# Patient Record
Sex: MALE | Race: WHITE | URBAN - METROPOLITAN AREA
[De-identification: names, ages, dates, MRNs, and addresses within clinical notes are randomized per-mention and may not be internally consistent; named-entity substitution may affect disease eponyms.]

---

## 2018-03-20 ENCOUNTER — EMERGENCY (EMERGENCY)
Facility: HOSPITAL | Age: 58
LOS: 1 days | Discharge: HOME | End: 2018-03-20
Admitting: EMERGENCY MEDICINE
Payer: MEDICAID

## 2018-03-20 VITALS
HEART RATE: 86 BPM | OXYGEN SATURATION: 98 % | SYSTOLIC BLOOD PRESSURE: 134 MMHG | DIASTOLIC BLOOD PRESSURE: 86 MMHG | TEMPERATURE: 97 F | RESPIRATION RATE: 18 BRPM

## 2018-03-20 DIAGNOSIS — F10.120 ALCOHOL ABUSE WITH INTOXICATION, UNCOMPLICATED: ICD-10-CM

## 2018-03-20 DIAGNOSIS — R41.82 ALTERED MENTAL STATUS, UNSPECIFIED: ICD-10-CM

## 2018-03-20 PROCEDURE — 99284 EMERGENCY DEPT VISIT MOD MDM: CPT | Mod: 25

## 2018-03-20 NOTE — ED PROVIDER NOTE - OBJECTIVE STATEMENT
57 year old male with h/o alcohol abuse presents with AMS status   atient BIBA from [facility] for alcohol intoxication; admits to drinking 57 year old male with h/o alcohol abuse presents with AMS status, brought in by EMS from [facility]. Admits to heavy ETOH use today, no other complaints.  Placed in stretcher and quickly falls asleep.  Unable to cooperate with remainder of history.

## 2018-03-20 NOTE — ED PROVIDER NOTE - PROGRESS NOTE DETAILS
patient sitting in chair, with steady gait. playing on ipad The patient is now awake and alert, clinically sober.  Able to walk a straight line.  Repeat exam and neuro/cranial nerve exams normal.  No evidence of head/neck trauma.  Patient denies any pain or other complaints.  Denies cp/sob/ha/abd pain.  Abd soft, lungs clear, heart exam normal.  Joel po challenge.  Patient says only used alcohol no other substances.  Denies any assault.  Feels much better and pt feels safe for discharge.  No evidence of intoxication at this time or alcohol withdrawal.  No other complaints on discharge. patient refusing to leave. police called to escort patient out

## 2018-03-20 NOTE — ED PROVIDER NOTE - NS ED ROS FT
· CONSTITUTIONAL: no fever and no chills.  · CARDIOVASCULAR: normal rate and rhythm, no chest pain and no edema.  · RESPIRATORY: no chest pain, no cough, and no shortness of breath.  · GASTROINTESTINAL: no abdominal pain, no bloating, no constipation, no diarrhea, no nausea and no vomiting.  · MUSCULOSKELETAL: no back pain, no musculoskeletal pain, no neck pain, and no weakness.  · SKIN: no abrasions, no jaundice, no lesions, no pruritis, and no rashes.  · NEURO: no loss of consciousness, no gait abnormality, no headache, no sensory deficits, and no weakness.  · PSYCHIATRIC: no known mental health issues.

## 2018-03-20 NOTE — ED PROVIDER NOTE - MEDICAL DECISION MAKING DETAILS
The patient is now awake and alert, clinically sober.  Able to walk a straight line.  Repeat exam and neuro/cranial nerve exams normal.  No evidence of head/neck trauma.  Patient denies any pain or other complaints.  Denies cp/sob/ha/abd pain.  Abd soft, lungs clear, heart exam normal.  Joel po challenge.  Patient says only used alcohol no other substances.  Denies any assault.  Feels much better and pt feels safe for discharge.  No evidence of intoxication at this time or alcohol withdrawal.  No other complaints on discharge.

## 2019-06-06 ENCOUNTER — HOSPITAL ENCOUNTER (INPATIENT)
Dept: HOSPITAL 74 - YASAS | Age: 59
LOS: 4 days | Discharge: HOME | End: 2019-06-10
Attending: SURGERY | Admitting: SURGERY
Payer: COMMERCIAL

## 2019-06-06 VITALS — BODY MASS INDEX: 20.9 KG/M2

## 2019-06-06 DIAGNOSIS — F32.9: ICD-10-CM

## 2019-06-06 DIAGNOSIS — F41.9: ICD-10-CM

## 2019-06-06 DIAGNOSIS — F12.20: ICD-10-CM

## 2019-06-06 DIAGNOSIS — B18.2: ICD-10-CM

## 2019-06-06 DIAGNOSIS — D70.9: ICD-10-CM

## 2019-06-06 DIAGNOSIS — Z86.69: ICD-10-CM

## 2019-06-06 DIAGNOSIS — F10.230: Primary | ICD-10-CM

## 2019-06-06 DIAGNOSIS — Z94.5: ICD-10-CM

## 2019-06-06 DIAGNOSIS — F17.210: ICD-10-CM

## 2019-06-06 LAB
ALBUMIN SERPL-MCNC: 3.8 G/DL (ref 3.4–5)
ALP SERPL-CCNC: 51 U/L (ref 45–117)
ALT SERPL-CCNC: 81 U/L (ref 13–61)
ANION GAP SERPL CALC-SCNC: 6 MMOL/L (ref 8–16)
AST SERPL-CCNC: 79 U/L (ref 15–37)
BILIRUB SERPL-MCNC: 0.8 MG/DL (ref 0.2–1)
BUN SERPL-MCNC: 7 MG/DL (ref 7–18)
CALCIUM SERPL-MCNC: 9.1 MG/DL (ref 8.5–10.1)
CHLORIDE SERPL-SCNC: 102 MMOL/L (ref 98–107)
CO2 SERPL-SCNC: 30 MMOL/L (ref 21–32)
CREAT SERPL-MCNC: 0.7 MG/DL (ref 0.55–1.3)
DEPRECATED RDW RBC AUTO: 12.8 % (ref 11.9–15.9)
GLUCOSE SERPL-MCNC: 95 MG/DL (ref 74–106)
HCT VFR BLD CALC: 41.8 % (ref 35.4–49)
HGB BLD-MCNC: 14.4 GM/DL (ref 11.7–16.9)
MCH RBC QN AUTO: 35.1 PG (ref 25.7–33.7)
MCHC RBC AUTO-ENTMCNC: 34.6 G/DL (ref 32–35.9)
MCV RBC: 101.6 FL (ref 80–96)
PLATELET # BLD AUTO: 100 K/MM3 (ref 134–434)
PMV BLD: 9.6 FL (ref 7.5–11.1)
POTASSIUM SERPLBLD-SCNC: 5 MMOL/L (ref 3.5–5.1)
PROT SERPL-MCNC: 7.5 G/DL (ref 6.4–8.2)
RBC # BLD AUTO: 4.11 M/MM3 (ref 4–5.6)
SODIUM SERPL-SCNC: 138 MMOL/L (ref 136–145)
WBC # BLD AUTO: 2.7 K/MM3 (ref 4–10)

## 2019-06-06 PROCEDURE — HZ2ZZZZ DETOXIFICATION SERVICES FOR SUBSTANCE ABUSE TREATMENT: ICD-10-PCS | Performed by: SURGERY

## 2019-06-06 RX ADMIN — Medication SCH MG: at 23:00

## 2019-06-06 RX ADMIN — HYDROCORTISONE SCH: 1 CREAM TOPICAL at 18:31

## 2019-06-06 RX ADMIN — HYDROCORTISONE SCH APPLIC: 1 CREAM TOPICAL at 23:01

## 2019-06-06 RX ADMIN — BACLOFEN PRN MG: 10 TABLET ORAL at 23:00

## 2019-06-06 NOTE — EKG
Test Reason : 

Blood Pressure : ***/*** mmHG

Vent. Rate : 043 BPM     Atrial Rate : 043 BPM

   P-R Int : 142 ms          QRS Dur : 092 ms

    QT Int : 474 ms       P-R-T Axes : 031 026 050 degrees

   QTc Int : 400 ms

 

MARKED SINUS BRADYCARDIA

ABNORMAL ECG

NO PREVIOUS ECGS AVAILABLE

Confirmed by QUENTIN RYAN MD ([id number]) on 6/6/2019 3:40:05 PM

 

Referred By:             Confirmed By:QUENTIN RYAN MD
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
## 2019-06-07 RX ADMIN — Medication PRN MG: at 22:07

## 2019-06-07 RX ADMIN — BACLOFEN PRN MG: 10 TABLET ORAL at 22:07

## 2019-06-07 RX ADMIN — HYDROCORTISONE SCH: 1 CREAM TOPICAL at 22:08

## 2019-06-07 RX ADMIN — Medication SCH MG: at 22:07

## 2019-06-07 RX ADMIN — HYDROCORTISONE SCH: 1 CREAM TOPICAL at 17:23

## 2019-06-07 RX ADMIN — Medication SCH TAB: at 10:02

## 2019-06-07 RX ADMIN — HYDROCORTISONE SCH: 1 CREAM TOPICAL at 14:08

## 2019-06-07 RX ADMIN — HYDROCORTISONE SCH: 1 CREAM TOPICAL at 10:03

## 2019-06-07 NOTE — PN
S CIWA





- CIWA Score


Nausea/Vomitin


Muscle Tremors: 2


Anxiety: 3


Agitation: 2


Paroxysmal Sweats: 1-Minimal Palms Moist


Orientation: 0-Oriented


Tacttile Disturbances: 1-Very Mild Itch/Numbness


Auditory Disturbances: 1-Very Mild


Visual Disturbances: 0-None


Headache: 2-Mild


CIWA-Ar Total Score: 14





BHS Progress Note (SOAP)


Subjective: 





alert,irritable,anxious,interrupted sleep,tremor


Objective: 





19 11:59


 Vital Signs











Temperature  97.0 F L  19 09:41


 


Pulse Rate  41 L  19 09:41


 


Respiratory Rate  17   19 09:41


 


Blood Pressure  115/71   19 09:41


 


O2 Sat by Pulse Oximetry (%)      








 Laboratory Last Values











WBC  2.7 K/mm3 (4.0-10.0)  L  19  13:40    


 


RBC  4.11 M/mm3 (4.00-5.60)   19  13:40    


 


Hgb  14.4 GM/dL (11.7-16.9)   19  13:40    


 


Hct  41.8 % (35.4-49)   19  13:40    


 


MCV  101.6 fl (80-96)  H  19  13:40    


 


MCH  35.1 pg (25.7-33.7)  H  19  13:40    


 


MCHC  34.6 g/dl (32.0-35.9)   19  13:40    


 


RDW  12.8 % (11.9-15.9)   19  13:40    


 


Plt Count  100 K/MM3 (134-434)  L  19  13:40    


 


MPV  9.6 fl (7.5-11.1)   19  13:40    


 


Platelet Comment  No clumping noted   19  13:40    


 


Sodium  138 mmol/L (136-145)   19  13:40    


 


Potassium  5.0 mmol/L (3.5-5.1)   19  13:40    


 


Chloride  102 mmol/L ()   19  13:40    


 


Carbon Dioxide  30 mmol/L (21-32)   19  13:40    


 


Anion Gap  6 MMOL/L (8-16)  L  19  13:40    


 


BUN  7 mg/dL (7-18)   19  13:40    


 


Creatinine  0.7 mg/dL (0.55-1.3)   19  13:40    


 


Est GFR (CKD-EPI)AfAm  121.41   19  13:40    


 


Est GFR (CKD-EPI)NonAf  104.76   19  13:40    


 


Random Glucose  95 mg/dL ()   19  13:40    


 


Calcium  9.1 mg/dL (8.5-10.1)   19  13:40    


 


Total Bilirubin  0.8 mg/dL (0.2-1)   19  13:40    


 


AST  79 U/L (15-37)  H  19  13:40    


 


ALT  81 U/L (13-61)  H  19  13:40    


 


Alkaline Phosphatase  51 U/L ()   19  13:40    


 


Total Protein  7.5 g/dl (6.4-8.2)   19  13:40    


 


Albumin  3.8 g/dl (3.4-5.0)   19  13:40    


 


RPR Titer  Nonreactive  (NONREACTIVE)   19  13:40    











Assessment: 





19 12:00


withdrawal symptom


Plan: 





continue detox,wbc is 2.7,ast 79,alt81

## 2019-06-07 NOTE — CONSULT
[facility] Psychiatric Consult





- Data


Date of interview: 06/07/19


Admission source: [facility]


Identifying data: First admission to [facility] for this 60 y/o  male 

self-referred (on the advice of his  from [facility]) for 

detoxification (alcohol, canabis). Examined at [address] [place] [address]. Patient is single, a 

father of two, domiciled, unemployed and supported on odd jobs. Mr Beck is 

a  [employer]  (three years of service).


Substance Abuse History: Confirmed by the patient in this interview. Details in 

current BHS report : Smoking history: Current every day smoker.  Have you 

smoked in the past 12 months: Yes.  Aproximately how many cigarettes per day: 

6.  Hx Chewing Tobacco Use: No.  Initiated information on smoking cessation: 

Yes.  'Breaking Loose' booklet given: 06/06/19.  - Substance & Tx. History.  Hx 

Alcohol Use: Yes.  Hx Substance Use: No.  Substance Use Type: Alcohol.  Hx 

Substance Use Treatment: Yes (last detox few months ago at [facility]).  - Substances 

abused.  ** Alcohol.  Substance route: Oral.  Frequency: Daily.  Amount used: 

VODKA- 1L BEERS- 10X 16OZ CANS.  Age of first use: 11.  Date of last use: 06/05/ 19


Medical History: Remarkable for hepatitis C and a recent history of withdrawal-

related seizures.


Psychiatric History: Patient endorses a history of one psychiatric 

hospitalization, in 2018, at the [facility] in the [place]. 

Diagnosed with MDD and PTSD. Mr Beck indicates that he has been prescribed 

psychotropic medications. No recall of names. " I never took them. I don't like 

taking medications of any kind. I threw them away. I don't remember their names 

". No show at the [facility] OPD clinic for 3-4 months (self-report). Patient 

denies history of suicide attempts.


Physical/Sexual Abuse/Trauma History: Patient denies.


Additional Comment: Urine drug screen results: THC-Marijuana. Noted.





Mental Status Exam





- Mental Status Exam


Alert and Oriented to: Time, Place, Person


Cognitive Function: Good


Patient Appearance: Well Groomed


Mood: Withdrawn, Hopeful


Affect: Appropriate, Normal Range


Patient Behavior: Fatigued, Appropriate (friendly), Cooperative


Speech Pattern: Clear, Appropriate


Voice Loudness: Normal


Thought Process: Intact, Goal Oriented


Thought Disorder: Not Present


Hallucinations: Denies


Suicidal Ideation: Denies


Homicidal Ideation: Denies


Insight/Judgement: Fair


Sleep: Well


Appetite: Good


Muscle strength/Tone: Normal


Gait/Station: Normal





Psychiatric Findings





- Problem List (Axis 1, 2,3)


(1) Alcohol dependence with uncomplicated withdrawal


Status: Acute   





(2) Cannabis dependence


Status: Chronic   





(3) Nicotine dependence


Status: Acute   


Qualifiers: 


   Nicotine product type: cigarettes   Substance use status: in withdrawal   

Qualified Code(s): F17.213 - Nicotine dependence, cigarettes, with withdrawal   





(4) History of depression


Status: Chronic   





- Initial Treatment Plan


Initial Treatment Plan: Psychoeducation. Sleep hygiene. Relapse prevention (MAT

) : discussed in this session. Detoxification. Observation.

## 2019-06-08 RX ADMIN — HYDROCORTISONE SCH APPLIC: 1 CREAM TOPICAL at 17:20

## 2019-06-08 RX ADMIN — MAGNESIUM HYDROXIDE PRN ML: 400 SUSPENSION ORAL at 14:30

## 2019-06-08 RX ADMIN — Medication SCH TAB: at 10:21

## 2019-06-08 RX ADMIN — HYDROCORTISONE SCH: 1 CREAM TOPICAL at 14:18

## 2019-06-08 RX ADMIN — HYDROCORTISONE SCH: 1 CREAM TOPICAL at 10:23

## 2019-06-08 RX ADMIN — HYDROCORTISONE SCH: 1 CREAM TOPICAL at 22:24

## 2019-06-08 RX ADMIN — Medication SCH MG: at 22:23

## 2019-06-08 NOTE — PN
[facility] CIWA





- CIWA Score


Nausea/Vomitin-No Nausea/No Vomiting


Muscle Tremors: 2


Anxiety: 2


Agitation: 2


Paroxysmal Sweats: 3


Orientation: 0-Oriented


Tacttile Disturbances: 0-None


Auditory Disturbances: 0-None


Visual Disturbances: 0-None


Headache: 2-Mild


CIWA-Ar Total Score: 11





BHS Progress Note (SOAP)


Subjective: 





c/o sweats, anxiety, headache, and interrupted sleep.


Objective: 





19 11:41


 Vital Signs











  19





  06:13 09:36


 


Temperature 97.2 F L 96.3 F L


 


Pulse Rate 47 L 55 L


 


Respiratory 18 18





Rate  


 


Blood Pressure 120/69 121/74








 Laboratory Last Values











WBC  2.7 K/mm3 (4.0-10.0)  L  19  13:40    


 


RBC  4.11 M/mm3 (4.00-5.60)   19  13:40    


 


Hgb  14.4 GM/dL (11.7-16.9)   19  13:40    


 


Hct  41.8 % (35.4-49)   19  13:40    


 


MCV  101.6 fl (80-96)  H  19  13:40    


 


MCH  35.1 pg (25.7-33.7)  H  19  13:40    


 


MCHC  34.6 g/dl (32.0-35.9)   19  13:40    


 


RDW  12.8 % (11.9-15.9)   19  13:40    


 


Plt Count  100 K/MM3 (134-434)  L  19  13:40    


 


MPV  9.6 fl (7.5-11.1)   19  13:40    


 


Platelet Comment  No clumping noted   19  13:40    


 


Sodium  138 mmol/L (136-145)   19  13:40    


 


Potassium  5.0 mmol/L (3.5-5.1)   19  13:40    


 


Chloride  102 mmol/L ()   19  13:40    


 


Carbon Dioxide  30 mmol/L (21-32)   19  13:40    


 


Anion Gap  6 MMOL/L (8-16)  L  19  13:40    


 


BUN  7 mg/dL (7-18)   19  13:40    


 


Creatinine  0.7 mg/dL (0.55-1.3)   19  13:40    


 


Est GFR (CKD-EPI)AfAm  121.41   19  13:40    


 


Est GFR (CKD-EPI)NonAf  104.76   19  13:40    


 


Random Glucose  95 mg/dL ()   19  13:40    


 


Calcium  9.1 mg/dL (8.5-10.1)   19  13:40    


 


Total Bilirubin  0.8 mg/dL (0.2-1)   19  13:40    


 


AST  79 U/L (15-37)  H  19  13:40    


 


ALT  81 U/L (13-61)  H  19  13:40    


 


Alkaline Phosphatase  51 U/L ()   19  13:40    


 


Total Protein  7.5 g/dl (6.4-8.2)   19  13:40    


 


Albumin  3.8 g/dl (3.4-5.0)   19  13:40    


 


RPR Titer  Nonreactive  (NONREACTIVE)   19  13:40    








Labs noted.


Assessment: 





19 11:42


AOX3, in no respiratory distress,


full rom, ambulating in the unit.


Withdrawal symptoms.


Plan: 





continue detox


increase fluids

## 2019-06-09 RX ADMIN — HYDROCORTISONE SCH: 1 CREAM TOPICAL at 22:12

## 2019-06-09 RX ADMIN — MAGNESIUM HYDROXIDE PRN ML: 400 SUSPENSION ORAL at 16:58

## 2019-06-09 RX ADMIN — HYDROCORTISONE SCH: 1 CREAM TOPICAL at 17:39

## 2019-06-09 RX ADMIN — HYDROCORTISONE SCH: 1 CREAM TOPICAL at 15:26

## 2019-06-09 RX ADMIN — Medication PRN MG: at 22:12

## 2019-06-09 RX ADMIN — HYDROCORTISONE SCH: 1 CREAM TOPICAL at 10:28

## 2019-06-09 RX ADMIN — BACLOFEN PRN MG: 10 TABLET ORAL at 17:01

## 2019-06-09 RX ADMIN — Medication SCH MG: at 22:12

## 2019-06-09 RX ADMIN — Medication SCH TAB: at 10:28

## 2019-06-09 NOTE — PN
[facility] CIWA





- CIWA Score


Nausea/Vomitin-Mild Nausea/No Vomiting


Muscle Tremors: 2


Anxiety: 2


Agitation: 1-Slight > Activity


Paroxysmal Sweats: 1-Minimal Palms Moist


Orientation: 1-Uncertain about Date


Tacttile Disturbances: 1-Very Mild Itch/Numbness


Auditory Disturbances: 0-None


Visual Disturbances: 0-None


Headache: 0-None Present


CIWA-Ar Total Score: 9





BHS Progress Note (SOAP)


Subjective: 





DOING BETTER TODAY 


READY TO BE DISCHARGED TOMORROW 


PREFERRING TO RETURN TO OUT PATIENT ALCOHOL REHAB PROGRAM NEAR HOME


Objective: 





19 12:21


 Vital Signs











Temperature  97.2 F L  19 06:12


 


Pulse Rate  53 L  19 06:12


 


Respiratory Rate  16   19 06:12


 


Blood Pressure  135/81   19 06:12


 


O2 Sat by Pulse Oximetry (%)      








 Laboratory Last Values











WBC  2.7 K/mm3 (4.0-10.0)  L  19  13:40    


 


RBC  4.11 M/mm3 (4.00-5.60)   19  13:40    


 


Hgb  14.4 GM/dL (11.7-16.9)   19  13:40    


 


Hct  41.8 % (35.4-49)   19  13:40    


 


MCV  101.6 fl (80-96)  H  19  13:40    


 


MCH  35.1 pg (25.7-33.7)  H  19  13:40    


 


MCHC  34.6 g/dl (32.0-35.9)   19  13:40    


 


RDW  12.8 % (11.9-15.9)   19  13:40    


 


Plt Count  100 K/MM3 (134-434)  L  19  13:40    


 


MPV  9.6 fl (7.5-11.1)   19  13:40    


 


Platelet Comment  No clumping noted   19  13:40    


 


Sodium  138 mmol/L (136-145)   19  13:40    


 


Potassium  5.0 mmol/L (3.5-5.1)   19  13:40    


 


Chloride  102 mmol/L ()   19  13:40    


 


Carbon Dioxide  30 mmol/L (21-32)   19  13:40    


 


Anion Gap  6 MMOL/L (8-16)  L  19  13:40    


 


BUN  7 mg/dL (7-18)   19  13:40    


 


Creatinine  0.7 mg/dL (0.55-1.3)   19  13:40    


 


Est GFR (CKD-EPI)AfAm  121.41   19  13:40    


 


Est GFR (CKD-EPI)NonAf  104.76   19  13:40    


 


Random Glucose  95 mg/dL ()   19  13:40    


 


Calcium  9.1 mg/dL (8.5-10.1)   19  13:40    


 


Total Bilirubin  0.8 mg/dL (0.2-1)   19  13:40    


 


AST  79 U/L (15-37)  H  19  13:40    


 


ALT  81 U/L (13-61)  H  19  13:40    


 


Alkaline Phosphatase  51 U/L ()   19  13:40    


 


Total Protein  7.5 g/dl (6.4-8.2)   19  13:40    


 


Albumin  3.8 g/dl (3.4-5.0)   19  13:40    


 


RPR Titer  Nonreactive  (NONREACTIVE)   19  13:40    








LAB NOTED


DISCONTINUE MOTRIN


19 12:22





Assessment: 





19 12:22


ALCOHOL WITHDRAWAL SX


Plan: 





CONTINUE ALCOHOL DETOX

## 2019-06-10 VITALS — TEMPERATURE: 96.1 F | SYSTOLIC BLOOD PRESSURE: 115 MMHG | DIASTOLIC BLOOD PRESSURE: 71 MMHG | HEART RATE: 58 BPM

## 2019-06-10 NOTE — DS
[facility] Detox Discharge Summary


Admission Date: 


06/06/19





Discharge Date: 06/10/19





- History


Present History: Alcohol Dependence


Additional Comments: 





59 years old male admitted on 06/06/19 for alcohol withdrawal stabilization


completed detox regimen aftercare community support self help group


Pertinent Past History: 





recommend the patient brings in medication list and lab report to primary care 

provider follow up low wbc


patient is asymptomatic at the present time 


encourage to discover low wbc origin





- Physical Exam Results


Vital Signs: 


 Vital Signs











Temperature  96.1 F L  06/10/19 09:07


 


Pulse Rate  58 L  06/10/19 09:07


 


Respiratory Rate  18   06/10/19 09:07


 


Blood Pressure  115/71   06/10/19 09:07


 


O2 Sat by Pulse Oximetry (%)      











Pertinent Admission Physical Exam Findings: 





alcohol withdrawal sx


 Laboratory Last Values











WBC  2.7 K/mm3 (4.0-10.0)  L  06/06/19  13:40    


 


RBC  4.11 M/mm3 (4.00-5.60)   06/06/19  13:40    


 


Hgb  14.4 GM/dL (11.7-16.9)   06/06/19  13:40    


 


Hct  41.8 % (35.4-49)   06/06/19  13:40    


 


MCV  101.6 fl (80-96)  H  06/06/19  13:40    


 


MCH  35.1 pg (25.7-33.7)  H  06/06/19  13:40    


 


MCHC  34.6 g/dl (32.0-35.9)   06/06/19  13:40    


 


RDW  12.8 % (11.9-15.9)   06/06/19  13:40    


 


Plt Count  100 K/MM3 (134-434)  L  06/06/19  13:40    


 


MPV  9.6 fl (7.5-11.1)   06/06/19  13:40    


 


Platelet Comment  No clumping noted   06/06/19  13:40    


 


Sodium  138 mmol/L (136-145)   06/06/19  13:40    


 


Potassium  5.0 mmol/L (3.5-5.1)   06/06/19  13:40    


 


Chloride  102 mmol/L ()   06/06/19  13:40    


 


Carbon Dioxide  30 mmol/L (21-32)   06/06/19  13:40    


 


Anion Gap  6 MMOL/L (8-16)  L  06/06/19  13:40    


 


BUN  7 mg/dL (7-18)   06/06/19  13:40    


 


Creatinine  0.7 mg/dL (0.55-1.3)   06/06/19  13:40    


 


Est GFR (CKD-EPI)AfAm  121.41   06/06/19  13:40    


 


Est GFR (CKD-EPI)NonAf  104.76   06/06/19  13:40    


 


Random Glucose  95 mg/dL ()   06/06/19  13:40    


 


Calcium  9.1 mg/dL (8.5-10.1)   06/06/19  13:40    


 


Total Bilirubin  0.8 mg/dL (0.2-1)   06/06/19  13:40    


 


AST  79 U/L (15-37)  H  06/06/19  13:40    


 


ALT  81 U/L (13-61)  H  06/06/19  13:40    


 


Alkaline Phosphatase  51 U/L ()   06/06/19  13:40    


 


Total Protein  7.5 g/dl (6.4-8.2)   06/06/19  13:40    


 


Albumin  3.8 g/dl (3.4-5.0)   06/06/19  13:40    


 


RPR Titer  Nonreactive  (NONREACTIVE)   06/06/19  13:40    








lab noted


low wbc


low wbc








- Treatment


Hospital Course: Detox Protocol Followed, Detoxed Safely, Responded well, 

Discharged Condition Good, Rehab Referral Accepted


Patient has Accepted a Rehab Referral to: community self help support 





- Medication


Discharge Medications: 


Ambulatory Orders





NK [No Known Home Medication]  06/06/19 











- Diagnosis


(1) Alcohol dependence with uncomplicated withdrawal


Current Visit: Yes   Status: Acute   





(2) Neutropenia


Current Visit: Yes   Status: Chronic   


Qualifiers: 


   Neutropenia type: unspecified   Qualified Code(s): D70.9 - Neutropenia, 

unspecified   





(3) Nicotine dependence


Current Visit: Yes   Status: Acute   


Qualifiers: 


   Nicotine product type: cigarettes   Substance use status: in withdrawal   

Qualified Code(s): F17.213 - Nicotine dependence, cigarettes, with withdrawal   





- AMA


Did Patient Leave Against Medical Advice: No

## 2021-05-04 NOTE — ED ADULT NURSE NOTE - CHIEF COMPLAINT QUOTE
Patient BIBA from [facility] for alcohol intoxication; admits to drinking Principal Discharge DX:	[place]   Principal Discharge DX:	[facility]  Secondary Diagnosis:	Injury of head, initial encounter

## 2021-05-04 NOTE — ED ADULT NURSE NOTE - NS PRO PASSIVE SMOKE EXP
5/4/2021    Ant Given. Chief Complaint   Patient presents with   Rober Culp Doctor    Follow-Up from Hospital     pt states that he was at the hosp for chest pain. pt states tht they did EKG and echo and labs alll came back good . They told him he had really bad acid reflux . Pt is taking omeprazole 1 pill daily and it does not seem to work very well .  Discuss Medications     pt states that he has not had any of his insulin or acid reflux meds for about 8 mths     Other     pt also states that he was on BP meds and blood thinner and has not been for 8 mths . Pt states that he had a blood clot in leg  a couple years . HPI  History was obtained from the patient. Lien Kerr is a 41 y.o. male who presents today to establish as a new patient and for hospital follow-up. Patient went to the hospital for stomach discomfort, reflux, and left-sided chest pain. He was having quite a bit of nausea as well and ended up vomiting up some blood; has history of Sri-De Jesus tear. His EKG showed T wave inversion that was new so they did serial troponins which were negative. They did an echocardiogram which was normal.  His D-dimer was elevated so they did a CTA of the chest, visualization was not so great sedated followed up with a VQ scan and between the both of them determined that there was no pulmonary embolism. He was consulted with cardiology who wanted to follow-up outpatient with further testing. Patient did end up checking himself out of the hospital AMA. Patient is still having the heartburn and will get intermittent sharp pains in his left chest.  Was previously on omeprazole 40 mg daily but has been out of that medication for the past 8 months. He has a history of diabetes type 2 which has currently been very much uncontrolled as he has been out of his insulin for about 8 months. His blood sugar in the hospital was 333 when he was first admitted to the ED.     He has history of hypertension and previously was on hydrochlorothiazide 25 mg once daily. He has been out of this medication for about 8 months and has not been monitoring his blood pressures at home. He has history of drug abuse consisting of heroin that he used for about 14 or so years give or take. He notes that he has been clean for about 6 months. He has history of asthma on his chart, when reviewing notes it looks like he was having some wheezing so the diagnosis was clinical and he was started on albuterol and Symbicort. I do not see where pulmonary function test was ever done. Patient currently denies shortness of breath or wheezing. He has history of DVT back in 2015 that occurred after being on bedrest for a foot fracture that was not surgically fixed for what ever reason per the surgeons recommendation. He consulted with Dr. Lisa Porter in the hospital and supposedly after he was discharged who did his coagulation studies. He was on a blood thinner for a while and eventually stopped taking it, he does not remember if he decided that where the doctor decided it and if he was still supposed to be taking it. Diagnosis of LVH was on his chart, not sure why as he just had a normal echo done on 4/26/2021. I will remove this diagnosis. Has history of alcohol abuse for several years at 12 pack of beer daily. He notes that he did quit alcohol after his ER/hospital visit. He has been off of the alcohol for about 9 to 10 days total and did not have any withdrawal symptoms. REVIEW OF SYMPTOMS    Review of Systems   Constitutional: Negative for chills and fever. Respiratory: Negative for shortness of breath and wheezing. Cardiovascular: Positive for chest pain. Gastrointestinal: Positive for abdominal pain (gerd) and nausea.        SOCIAL HISTORY  Social History     Socioeconomic History    Marital status:      Spouse name: None    Number of children: None    Years of education: None    Highest education level: None   Occupational History    None   Social Needs    Financial resource strain: None    Food insecurity     Worry: None     Inability: None    Transportation needs     Medical: None     Non-medical: None   Tobacco Use    Smoking status: Current Every Day Smoker     Packs/day: 1.00     Years: 26.00     Pack years: 26.00     Types: Cigarettes     Start date: 1994    Smokeless tobacco: Never Used   Substance and Sexual Activity    Alcohol use: Not Currently     Comment: was drinking a 12 pack per day    Drug use: Not Currently     Comment: h/o heroine abuse for 14 years    Sexual activity: Yes     Partners: Female   Lifestyle    Physical activity     Days per week: None     Minutes per session: None    Stress: None   Relationships    Social connections     Talks on phone: None     Gets together: None     Attends Mu-ism service: None     Active member of club or organization: None     Attends meetings of clubs or organizations: None     Relationship status: None    Intimate partner violence     Fear of current or ex partner: None     Emotionally abused: None     Physically abused: None     Forced sexual activity: None   Other Topics Concern    None   Social History Narrative    None        CURRENT MEDICATIONS  Current Outpatient Medications   Medication Sig Dispense Refill    latanoprost (XALATAN) 0.005 % ophthalmic solution PLACE 1 DROP IN EACH EYE EVERY NIGHT AT BEDTIME      omeprazole (PRILOSEC) 40 MG delayed release capsule Take 1 capsule by mouth every morning (before breakfast) 30 capsule 2    insulin glargine (LANTUS SOLOSTAR) 100 UNIT/ML injection pen Inject 40 Units into the skin nightly 5 pen 2    insulin aspart (NOVOLOG FLEXPEN) 100 UNIT/ML injection pen Inject 5 Units into the skin 3 times daily (before meals) 5 pen 2    hydroCHLOROthiazide (HYDRODIURIL) 25 MG tablet Take 1 tablet by mouth every morning 90 tablet 1     No current facility-administered medications for this visit. PHYSICAL EXAM    BP (!) 160/100   Pulse 99   Ht 5' 6\" (1.676 m)   Wt 250 lb 6.4 oz (113.6 kg)   SpO2 95%   BMI 40.42 kg/m²     Physical Exam  Vitals signs and nursing note reviewed. Constitutional:       Appearance: Normal appearance. He is obese. HENT:      Head: Normocephalic and atraumatic. Right Ear: External ear normal.      Left Ear: External ear normal.   Cardiovascular:      Rate and Rhythm: Normal rate and regular rhythm. Pulses: Normal pulses. Heart sounds: Normal heart sounds. Pulmonary:      Effort: Pulmonary effort is normal.      Breath sounds: Normal breath sounds. Neurological:      General: No focal deficit present. Mental Status: He is alert and oriented to person, place, and time. Cranial Nerves: No cranial nerve deficit. Psychiatric:         Mood and Affect: Mood normal.         Behavior: Behavior normal.         ASSESSMENT & PLAN    1. Chest pain, unspecified type  Due to the EKG changes would like to get Cardiolite stress test to further evaluate the chest pain. Depending on results may or may not need to follow-up with cardiology outpatient.  - NM MYOCARDIAL SPECT REST EXERCISE OR RX; Future    2. Gastroesophageal reflux disease without esophagitis  Restart omeprazole 40 mg once daily and refer to gastroenterology due to the hematemesis and history of Sri-De Jesus tears. - omeprazole (PRILOSEC) 40 MG delayed release capsule; Take 1 capsule by mouth every morning (before breakfast)  Dispense: 30 capsule; Refill: 2  - [place] CNP, Gastroenterology, [place]    3. Hematemesis with nausea  See #2  - Mendoza Coyle CNP, Gastroenterology, [place]    4. History of Sri-De Jesus syndrome  See #2  - Mendoza Coyle CNP, Gastroenterology, [place]    5. Type 2 diabetes mellitus without complication, with long-term current use of insulin (HCC)  Restart insulin.   Would like patient to just start at 20 units at night of the Lantus since he has quit his large alcohol consumption. He went from 12 beers a day down to 0 and I am sure this has had an impact on his blood sugar. We can taper up gradually depending on what his blood sugar readings are. He is to give call report in a couple of weeks of his fasting blood sugars and his postprandial blood sugars. We will have him follow-up in the office in 3 months.  - insulin glargine (LANTUS SOLOSTAR) 100 UNIT/ML injection pen; Inject 40 Units into the skin nightly  Dispense: 5 pen; Refill: 2  - insulin aspart (NOVOLOG FLEXPEN) 100 UNIT/ML injection pen; Inject 5 Units into the skin 3 times daily (before meals)  Dispense: 5 pen; Refill: 2    6. Essential hypertension  Not well controlled, will restart hydrochlorothiazide. Would like patient to come in for nurse visit to recheck his blood pressure in a couple of weeks  - hydroCHLOROthiazide (HYDRODIURIL) 25 MG tablet; Take 1 tablet by mouth every morning  Dispense: 90 tablet; Refill: 1    7. History of drug abuse in remission [facility])  Patient has been off of the heroin for about 6 months, congratulations was given and patient encouraged to keep on a clean track. 8. Moderate asthma without complication, unspecified whether persistent  If patient starts to develop wheezing or shortness of breath again would recommend pulmonary function test.    9. History of DVT (deep vein thrombosis)  Reviewed old notes from his hospital stay when he was diagnosed with a DVT, there was anticoagulation studies done by do not see any notes on interpretation or how long he needed to be on anticoagulation therapy. I sent a message to Dr. Sudha Ceron to clarify if he is able to. 10. Alcohol abuse  Patient quit alcohol about 9 or 10 days ago and has not had any issues with withdrawal despite quitting cold turkey. Patient was encouraged to stay off of the alcohol.       Time spent: 65 min spent with patient face-to-face, reviewing chart, coming up with treatment plan, and documenting. This time also includes any time spent consulting with attending physician. Electronically signed by Chrystal Prader, PA-C on 5/4/2021    Please note that this chart was generated using dragon dictation software. Although every effort was made to ensure the accuracy of this automated transcription, some errors in transcription may have occurred. Unknown